# Patient Record
Sex: MALE | Race: BLACK OR AFRICAN AMERICAN | NOT HISPANIC OR LATINO | URBAN - METROPOLITAN AREA
[De-identification: names, ages, dates, MRNs, and addresses within clinical notes are randomized per-mention and may not be internally consistent; named-entity substitution may affect disease eponyms.]

---

## 2018-09-28 ENCOUNTER — EMERGENCY (EMERGENCY)
Facility: HOSPITAL | Age: 33
LOS: 1 days | Discharge: HOME | End: 2018-09-28
Attending: EMERGENCY MEDICINE | Admitting: EMERGENCY MEDICINE
Payer: MEDICARE

## 2018-09-28 VITALS
OXYGEN SATURATION: 100 % | SYSTOLIC BLOOD PRESSURE: 135 MMHG | DIASTOLIC BLOOD PRESSURE: 89 MMHG | HEART RATE: 80 BPM | TEMPERATURE: 98 F | RESPIRATION RATE: 20 BRPM

## 2018-09-28 PROCEDURE — 99283 EMERGENCY DEPT VISIT LOW MDM: CPT | Mod: 25

## 2018-09-28 RX ORDER — IBUPROFEN 200 MG
600 TABLET ORAL ONCE
Refills: 0 | Status: COMPLETED | OUTPATIENT
Start: 2018-09-28 | End: 2018-09-28

## 2018-09-28 RX ADMIN — Medication 600 MILLIGRAM(S): at 22:44

## 2018-09-28 NOTE — ED PROVIDER NOTE - PHYSICAL EXAMINATION
Comfortable, no acute distress  NCAT  PERRL, EOMI  RRR  CTAB  soft, NTND, obese  skin normal, no rashes  b/l lower extremity edema extending to thighs, varicose veins  AAOx3, motor/sensory grossly intact

## 2018-09-28 NOTE — ED PROVIDER NOTE - OBJECTIVE STATEMENT
pt with chronic b/l leg swelling and tightness, also with chronic low back pain, no cough/sob/CP, no abd pain/f/c

## 2018-09-28 NOTE — ED ADULT NURSE NOTE - OBJECTIVE STATEMENT
pt is  a 33 year old male who comes into the ed c/o right lower leg pain. pt reports he always has it, comes to ED's and they give him water pills. pt denies having long term prescriptions. pt denies injury. bilateral leg swelling noted.

## 2018-09-28 NOTE — ED ADULT TRIAGE NOTE - CHIEF COMPLAINT QUOTE
BIBA for chronic pain and swelling to BLE and chronic lower back pain. Patient is ambulatory without assist and appears to be in no acute pain or distress.

## 2018-09-28 NOTE — ED PROVIDER NOTE - MEDICAL DECISION MAKING DETAILS
edema, likely dependent, US not available tonight, will give HCTZ, motrin for pain, pt instructed to return tomorrow after 8am for US to r/o DVT. No signs of PE currently

## 2018-09-28 NOTE — ED ADULT NURSE NOTE - CHPI ED NUR SYMPTOMS NEG
no abrasion/no back pain/no bruising/no deformity/no difficulty bearing weight/no fever/no numbness/no stiffness/no tingling/no weakness

## 2018-09-28 NOTE — ED ADULT NURSE NOTE - NSIMPLEMENTINTERV_GEN_ALL_ED
Implemented All Universal Safety Interventions:  [place] to call system. Call bell, personal items and telephone within reach. Instruct patient to call for assistance. Room bathroom lighting operational. Non-slip footwear when patient is off stretcher. Physically safe environment: no spills, clutter or unnecessary equipment. Stretcher in lowest position, wheels locked, appropriate side rails in place.

## 2018-09-29 ENCOUNTER — EMERGENCY (EMERGENCY)
Facility: HOSPITAL | Age: 33
LOS: 1 days | Discharge: HOME | End: 2018-09-29
Admitting: EMERGENCY MEDICINE
Payer: MEDICARE

## 2018-09-29 VITALS
DIASTOLIC BLOOD PRESSURE: 90 MMHG | OXYGEN SATURATION: 99 % | RESPIRATION RATE: 18 BRPM | HEART RATE: 89 BPM | SYSTOLIC BLOOD PRESSURE: 155 MMHG | TEMPERATURE: 98 F

## 2018-09-29 VITALS
DIASTOLIC BLOOD PRESSURE: 87 MMHG | TEMPERATURE: 98 F | RESPIRATION RATE: 17 BRPM | OXYGEN SATURATION: 98 % | SYSTOLIC BLOOD PRESSURE: 147 MMHG | HEART RATE: 84 BPM

## 2018-09-29 PROCEDURE — 93970 EXTREMITY STUDY: CPT | Mod: 26

## 2018-09-29 PROCEDURE — 99284 EMERGENCY DEPT VISIT MOD MDM: CPT

## 2018-09-29 RX ORDER — IBUPROFEN 200 MG
600 TABLET ORAL ONCE
Refills: 0 | Status: COMPLETED | OUTPATIENT
Start: 2018-09-29 | End: 2018-09-29

## 2018-09-29 RX ADMIN — Medication 600 MILLIGRAM(S): at 12:25

## 2018-09-29 NOTE — ED PROVIDER NOTE - MEDICAL DECISION MAKING DETAILS
No DVT visualized on US. Pt given HCTZ 25mg PO and motrin 600mg PO. No other complaints at this time. F/U with [facility] this week. Strict return precautions reviewed with pt in which pt verbalizes understanding and agrees to.

## 2018-09-29 NOTE — ED ADULT TRIAGE NOTE - CHIEF COMPLAINT QUOTE
b/l swollen legs for over a week, told to return today for an ultrasound. denies injury/SOB/fever states he is homeless and walks frequently

## 2018-09-29 NOTE — ED PROVIDER NOTE - OBJECTIVE STATEMENT
34 y/o undomiciled M returns to ED for doppler US of B/L LE's to R/O DVT. He was seen here yesterday for evaluation of chronic B/L LE swelling and discomfort and was advised to return today for US. He is also requesting another dose of HCTZ 25mg PO and Motrin 600mg PO for the discomfort in his legs. He denies any recent change in the quality or severity of his sx's. No other acute medical complaints at this time.     Denies fever, chills, dizziness, syncope, CP, SOB, cough, hemoptysis, palpitations, LE numbness, weakness, recent injury

## 2018-09-29 NOTE — ED PROVIDER NOTE - NSFOLLOWUPCLINICS_GEN_ALL_ED_FT
[facility]  Family Medicine  [address]  [place], [place] [address]  Phone: [phone]  Fax:   Follow Up Time: 1-3 Days

## 2018-10-02 DIAGNOSIS — R60.0 LOCALIZED EDEMA: ICD-10-CM

## 2018-10-02 DIAGNOSIS — M79.89 OTHER SPECIFIED SOFT TISSUE DISORDERS: ICD-10-CM

## 2018-10-02 DIAGNOSIS — J45.909 UNSPECIFIED ASTHMA, UNCOMPLICATED: ICD-10-CM

## 2018-10-02 DIAGNOSIS — M54.5 LOW BACK PAIN: ICD-10-CM

## 2018-10-02 DIAGNOSIS — Z91.013 ALLERGY TO SEAFOOD: ICD-10-CM

## 2018-10-02 DIAGNOSIS — G89.29 OTHER CHRONIC PAIN: ICD-10-CM

## 2018-10-03 DIAGNOSIS — J45.909 UNSPECIFIED ASTHMA, UNCOMPLICATED: ICD-10-CM

## 2018-10-03 DIAGNOSIS — M79.89 OTHER SPECIFIED SOFT TISSUE DISORDERS: ICD-10-CM

## 2018-10-03 DIAGNOSIS — Z91.013 ALLERGY TO SEAFOOD: ICD-10-CM

## 2018-10-03 DIAGNOSIS — R60.9 EDEMA, UNSPECIFIED: ICD-10-CM

## 2020-04-22 NOTE — ED PROVIDER NOTE - CONSTITUTIONAL NOURISHMENT, MLM
Pt to OR today with Dr. Israel for L Leg I&D with wound vac placement. This RN discussed with Abeba JACKSONN. Per Miguel pt to return to OR on Friday 4/24/2020. Wound team not currently following wound vac. Please contact surgery with any questions or concerns regarding wound vac.    OBESE